# Patient Record
Sex: FEMALE | Race: WHITE | NOT HISPANIC OR LATINO | Employment: UNEMPLOYED | URBAN - METROPOLITAN AREA
[De-identification: names, ages, dates, MRNs, and addresses within clinical notes are randomized per-mention and may not be internally consistent; named-entity substitution may affect disease eponyms.]

---

## 2023-05-25 ENCOUNTER — OFFICE VISIT (OUTPATIENT)
Dept: GYNECOLOGY | Facility: CLINIC | Age: 48
End: 2023-05-25

## 2023-05-25 VITALS
WEIGHT: 191.8 LBS | DIASTOLIC BLOOD PRESSURE: 90 MMHG | HEART RATE: 81 BPM | BODY MASS INDEX: 30.1 KG/M2 | HEIGHT: 67 IN | SYSTOLIC BLOOD PRESSURE: 130 MMHG

## 2023-05-25 DIAGNOSIS — N84.0 ENDOMETRIAL POLYP: ICD-10-CM

## 2023-05-25 DIAGNOSIS — R35.0 URINARY FREQUENCY: ICD-10-CM

## 2023-05-25 DIAGNOSIS — D21.9 FIBROIDS: ICD-10-CM

## 2023-05-25 DIAGNOSIS — R10.2 PELVIC PAIN: ICD-10-CM

## 2023-05-25 DIAGNOSIS — N92.1 MENORRHAGIA WITH IRREGULAR CYCLE: Primary | ICD-10-CM

## 2023-05-25 RX ORDER — LORATADINE 10 MG/1
10 TABLET ORAL DAILY
COMMUNITY

## 2023-05-25 RX ORDER — LEVOTHYROXINE SODIUM 150 MCG
150 TABLET ORAL DAILY
COMMUNITY
Start: 2023-04-29

## 2023-05-25 RX ORDER — TRANEXAMIC ACID 650 MG/1
TABLET ORAL
COMMUNITY
Start: 2023-04-17

## 2023-05-25 NOTE — PROGRESS NOTES
Assessment/Plan:         Diagnoses and all orders for this visit:    Menorrhagia with irregular cycle; discussed treatment options  She will first return to the office for TVS SIS possible biopsy  Should there be a submucosal polyp we did discuss proceeding with a hysteroscopic polypectomy with or without endometrial ablation patient desires proceeding with endometrial ablation along with possible polypectomy  Fibroids    Endometrial polyp    Pelvic pain; discussed possibility of interstitial cystitis  We will proceed with cystoscopy    Urinary frequency      Entire visit discussion and review of past medical records and imaging-45 minutes  Subjective:      Patient ID: Clare Lainez is a 49 y o  female  HPI G2 P , C section x 1-twins,  x1, new patient, present to discuss heavy and irregular bleeding with pelvic pain/pressure  Patient states that she has been experiencing heavy and irregular bleeding for over 1 year  She was placed on TXA with some help in decreasing flow up until recently  On May 4 she bled heavily with passage of large clots  This lasted for 4 days  She then spotted on and off for approximately 2 weeks  Over the last 6 days she has had no bleeding  She was seen in the emergency room on May 9 because of the heavy bleeding  Ultrasound was done which revealed suspicion for endometrial polyp  There was noted to be 2 intramural fibroids measuring 1 8 cm and 1 6 cm  Patient had a prior hysteroscopic myomectomy in 2021  For contraception partner uses condoms  Patient also has been experiencing increasing pelvic pressure and urinary frequency  She does have a history of urolithiasis and is status post lithotripsy in 2023  She continues to have pelvic pressure and urinary frequency with a normal urine analysis  The following portions of the patient's history were reviewed and updated as appropriate:   She  has no past medical history on file    She There are no problems to display for this patient  She  has no past surgical history on file  Her family history is not on file  She  has no history on file for tobacco use, alcohol use, and drug use  No current outpatient medications on file  No current facility-administered medications for this visit  No current outpatient medications on file prior to visit  No current facility-administered medications on file prior to visit  She is allergic to sulfamethoxazole-trimethoprim       Review of Systems      Objective: There were no vitals taken for this visit           Physical Exam